# Patient Record
Sex: MALE | Race: WHITE | NOT HISPANIC OR LATINO | URBAN - METROPOLITAN AREA
[De-identification: names, ages, dates, MRNs, and addresses within clinical notes are randomized per-mention and may not be internally consistent; named-entity substitution may affect disease eponyms.]

---

## 2023-11-30 ENCOUNTER — CONSULT (OUTPATIENT)
Dept: ENDOCRINOLOGY | Facility: CLINIC | Age: 33
End: 2023-11-30
Payer: COMMERCIAL

## 2023-11-30 VITALS
WEIGHT: 205 LBS | DIASTOLIC BLOOD PRESSURE: 79 MMHG | HEIGHT: 71 IN | BODY MASS INDEX: 28.7 KG/M2 | SYSTOLIC BLOOD PRESSURE: 120 MMHG | HEART RATE: 76 BPM

## 2023-11-30 DIAGNOSIS — Z11.3 ENCOUNTER FOR SCREENING EXAMINATION FOR SEXUALLY TRANSMITTED DISEASE: Primary | ICD-10-CM

## 2023-11-30 DIAGNOSIS — Z31.41 FERTILITY TESTING: ICD-10-CM

## 2023-11-30 DIAGNOSIS — Z13.71 SCREENING FOR GENETIC DISEASE CARRIER STATUS: ICD-10-CM

## 2023-11-30 PROCEDURE — 99212 OFFICE O/P EST SF 10 MIN: CPT | Performed by: OBSTETRICS & GYNECOLOGY

## 2023-11-30 PROCEDURE — 99202 OFFICE O/P NEW SF 15 MIN: CPT | Performed by: OBSTETRICS & GYNECOLOGY

## 2023-11-30 ASSESSMENT — PAIN SCALES - GENERAL: PAINLEVEL: 0-NO PAIN

## 2023-11-30 NOTE — PROGRESS NOTES
NEW FERTILITY PATIENT VISIT- Male Partner    Partner information:     Isak Barcenas is a 33 y.o. male who presents with female partner for infertility evaluation       Brief history:   PARTNER HISTORY  Partner Name: Partner name:: Isak Barcenas  : Partner :: 88  Occupation: Partner occupation::   Prior fertility history: Partner Prior Fertility History:: Has never conceived  PMH:    PSH: Partner Past Surgical History:: Hernia surgery childhood  Smoking:Partner: Recent or current tobacco use: No  Alcohol Use: Partner: Recent or current alcohol use: No  Drug Use: Partner: Recent or current drug use: No  Medications:    Injuries: Partner: Any history of surgeries or injuries in the reproductive area?: No  STD: Have you ever been diagnosed with a sexually transmitted disease?: No  Please select all that are applicable:    SA: Prior Semen Analysis completed?: Yes  SA Results: Where the results normal?: Yes     ANDROLOGY   [facility]10/  Component 10/24/2018 10/24/2018           Semen Volume 2.7 --   Semen pH 7.6 --   Concentration 66.40 --   % Motile Sperm 65 --   Total Count Sperm 179.28 --   Total Motile Sperm 116.53 --   Forward Progression 3 --   Sperm Diff,  Franki 6 --   Abnormal Head 94 --   Color, Semen OPAQUE --   Undiff Rnd Cell Rout Semen Anl 2.20 High     --   Semen Viscosity NORMAL --   Semen Age 67 High     --   Abstinence Time, Semen 3 --   Collection Time 1,300 --   Receipt Time 1,405 --   Slide No. Semen Rout  --   Semen Comment 1 MANUAL COUNT AND MOTILITY USED. --   Semen Comment 2 PARTNERS NAME:HERNANDO BARCENAS, MRN:[id number], :1993. --   Was given antibiotic after this SA due to high round cell count.          Prior Labs  Lab Results    Date Done      Hepatitis B surface antigen: No results found for requested labs within last 365 days. No results found for requested labs within last 365 days.   Hepatitis C antibody: No results found for requested labs  "within last 365 days. No results found for requested labs within last 365 days.   HIV ½ Antigen Antibody screen with reflex: No results found for requested labs within last 365 days. No results found for requested labs within last 365 days.   Syphilis screening with reflex: No results found for requested labs within last 365 days. No results found for requested labs within last 365 days.   GC: No results found for requested labs within last 365 days. No results found for requested labs within last 365 days.   CT: No results found for requested labs within last 365 days. No results found for requested labs within last 365 days.      PMH  History reviewed. No pertinent past medical history.     MEDICATIONS  No current outpatient medications on file prior to visit.     No current facility-administered medications on file prior to visit.       PSH  History reviewed. No pertinent surgical history.       SOCIAL HISTORY  Social History     Tobacco Use    Smoking status: Former     Types: Cigarettes    Smokeless tobacco: Never   Substance Use Topics    Alcohol use: Yes     Comment: Socially    Drug use: Never         FAMILY HISTORY   No family history on file.     BMI:   BMI Readings from Last 1 Encounters:   11/30/23 28.59 kg/m²     VITALS:  /79 (BP Location: Right arm, Patient Position: Sitting, BP Cuff Size: Adult)   Pulse 76   Ht 1.803 m (5' 11\")   Wt 93 kg (205 lb)   BMI 28.59 kg/m²       ASSESSMENT   33 y.o. male presents with partner for infertility evaluation  SA: Normal      PLAN  No orders of the defined types were placed in this encounter.      PARTNER  Yes Semen Analysis: Ordered  Yes Genetic screening: Ordered  STDs as above    FOLLOW UP   Follow up with partner for follow up visit as directed to review result and further management.     Magda Cancino  11/30/2023  9:31 AM    "

## 2024-04-08 ENCOUNTER — APPOINTMENT (OUTPATIENT)
Dept: ENDOCRINOLOGY | Facility: CLINIC | Age: 34
End: 2024-04-08
Payer: COMMERCIAL